# Patient Record
Sex: MALE | Race: WHITE | NOT HISPANIC OR LATINO | Employment: FULL TIME | URBAN - METROPOLITAN AREA
[De-identification: names, ages, dates, MRNs, and addresses within clinical notes are randomized per-mention and may not be internally consistent; named-entity substitution may affect disease eponyms.]

---

## 2022-09-08 ENCOUNTER — TELEPHONE (OUTPATIENT)
Dept: OTHER | Facility: OTHER | Age: 47
End: 2022-09-08

## 2022-10-03 ENCOUNTER — OFFICE VISIT (OUTPATIENT)
Dept: UROLOGY | Facility: AMBULATORY SURGERY CENTER | Age: 47
End: 2022-10-03
Payer: COMMERCIAL

## 2022-10-03 VITALS
DIASTOLIC BLOOD PRESSURE: 80 MMHG | OXYGEN SATURATION: 99 % | HEART RATE: 72 BPM | WEIGHT: 158 LBS | SYSTOLIC BLOOD PRESSURE: 130 MMHG

## 2022-10-03 DIAGNOSIS — N48.6 PEYRONIE'S DISEASE: Primary | ICD-10-CM

## 2022-10-03 PROCEDURE — 99203 OFFICE O/P NEW LOW 30 MIN: CPT | Performed by: NURSE PRACTITIONER

## 2022-10-03 NOTE — PROGRESS NOTES
10/3/2022    Assessment and Plan    52 y o  male managed by NEW PATIENT    1  Peyronie's Disease   · Referral to [facility] provided  · Given increasing pain and discomfort associated with sexual activity, we discussed refraining from sexual activity to follow-up performed with referral above        History of Present Illness  Josef Malone is a 52 y o  male here for follow up evaluation of  penile lump  Patient initially evaluated by [facility] secondary to complaints of a lump in his right lateral penis  Ultrasound of the penis was performed with no remarkable findings  Patient reports continued abnormal curvature of his erection to his right with increasing difficulty with sexual activity secondary to pain and difficulty penetrating  He denies all lower urinary tract symptoms  He denies prior injury or trauma to the penis  He denies difficulties with ejaculating  He denies scrotal/testicular symptoms  Review of Systems   Constitutional: Negative for chills and fever  Respiratory: Negative for cough and shortness of breath  Cardiovascular: Negative for chest pain  Gastrointestinal: Negative for abdominal distention, abdominal pain, blood in stool, nausea and vomiting  Genitourinary: Positive for penile pain  Negative for difficulty urinating, dysuria, enuresis, flank pain, frequency, hematuria and urgency  Skin: Negative for rash  Past Medical History  History reviewed  No pertinent past medical history      Past Social History  Past Surgical History:   Procedure Laterality Date    ROTATOR CUFF REPAIR Left 2017    SPINAL FUSION N/A 2005     Social History     Tobacco Use   Smoking Status Never Smoker   Smokeless Tobacco Never Used       Past Family History  Family History   Problem Relation Age of Onset    Ulcerative colitis Father     Breast cancer Paternal Grandmother     Heart attack Maternal Grandfather        Past Social history  Social History     Socioeconomic History    Marital status: /Civil Union     Spouse name: Not on file    Number of children: Not on file    Years of education: Not on file    Highest education level: Not on file   Occupational History    Not on file   Tobacco Use    Smoking status: Never Smoker    Smokeless tobacco: Never Used   Vaping Use    Vaping Use: Never used   Substance and Sexual Activity    Alcohol use: Yes     Comment: socially    Drug use: Never    Sexual activity: Yes     Partners: Female   Other Topics Concern    Not on file   Social History Narrative    Not on file     Social Determinants of Health     Financial Resource Strain: Not on file   Food Insecurity: Not on file   Transportation Needs: Not on file   Physical Activity: Not on file   Stress: Not on file   Social Connections: Not on file   Intimate Partner Violence: Not on file   Housing Stability: Not on file       Current Medications  No current outpatient medications on file  No current facility-administered medications for this visit  Allergies  No Known Allergies      The following portions of the patient's history were reviewed and updated as appropriate: allergies, current medications, past medical history, past social history, past surgical history and problem list       Vitals  Vitals:    10/03/22 1200   BP: 130/80   BP Location: Right arm   Patient Position: Sitting   Cuff Size: Adult   Pulse: 72   SpO2: 99%   Weight: 71 7 kg (158 lb)           Physical Exam  Physical Exam  Vitals reviewed  Constitutional:       General: He is not in acute distress  Appearance: Normal appearance  He is normal weight  HENT:      Head: Normocephalic  Cardiovascular:      Rate and Rhythm: Normal rate  Pulmonary:      Effort: No respiratory distress  Breath sounds: Normal breath sounds     Genitourinary:     Testes: Normal       Comments: Palpable right lateral scar tissue noted in the mid to proximal penile shaft approximately the size of a pea   Skin:     General: Skin is warm and dry  Neurological:      General: No focal deficit present  Mental Status: He is alert and oriented to person, place, and time  Psychiatric:         Mood and Affect: Mood normal          Behavior: Behavior normal        Results  No results found for this or any previous visit (from the past 1 hour(s))  ]  No results found for: PSA  No results found for: GLUCOSE, CALCIUM, NA, K, CO2, CL, BUN, CREATININE  No results found for: WBC, HGB, HCT, MCV, PLT    Orders  No orders of the defined types were placed in this encounter        AFSHAN Canas

## 2022-10-05 ENCOUNTER — TELEPHONE (OUTPATIENT)
Dept: UROLOGY | Facility: AMBULATORY SURGERY CENTER | Age: 47
End: 2022-10-05

## 2022-10-05 NOTE — TELEPHONE ENCOUNTER
Attempted to reach Patient at # [phone] listed in encounter  Phone just rang with no option to leave a message  Will need to try again at another time

## 2022-10-05 NOTE — TELEPHONE ENCOUNTER
Patient left voicemail  Patient last saw Lili Malagon on 10/3/22 in [place]  Patient requesting another doctor referral  He stated Dr Lorri Quiroz is not at either location that he was given      He is requesting a call back at [phone]

## 2022-10-06 NOTE — TELEPHONE ENCOUNTER
Patient returning call  Patient had the [phone] number but he was told he is not longer practicing there  He stated as per the website, looks like he is now in [facility] and phone number is [phone]

## 2023-03-29 ENCOUNTER — HOSPITAL ENCOUNTER (EMERGENCY)
Facility: HOSPITAL | Age: 48
Discharge: HOME | End: 2023-03-29
Attending: EMERGENCY MEDICINE

## 2023-03-29 VITALS
WEIGHT: 168.1 LBS | RESPIRATION RATE: 16 BRPM | DIASTOLIC BLOOD PRESSURE: 78 MMHG | TEMPERATURE: 97.6 F | OXYGEN SATURATION: 100 % | SYSTOLIC BLOOD PRESSURE: 121 MMHG | HEART RATE: 65 BPM

## 2023-03-29 DIAGNOSIS — S01.01XA LACERATION OF SCALP, INITIAL ENCOUNTER: Primary | ICD-10-CM

## 2023-03-29 NOTE — ED PROVIDER NOTES
History  Chief Complaint   Patient presents with   • Head Laceration     Walking under a piece of conduit and it caught his head and has a small laceration to the top of the head, bleeding controlled, no LOC  79-year-old male without significant past medical history presents for evaluation of laceration to the scalp after an injury at work  Patient states that he walked into a piece of conduit and noticed a cut on his head  Patient stated that it was a light hit and had no loss of consciousness but there was significant bleeding and he wanted to be evaluated  Patient was able to stop the bleeding prior to arrival   Patient denies any other complaints  History provided by:  Patient   used: No        None       History reviewed  No pertinent past medical history  Past Surgical History:   Procedure Laterality Date   • ROTATOR CUFF REPAIR Left 2017   • SPINAL FUSION N/A 2005       Family History   Problem Relation Age of Onset   • Ulcerative colitis Father    • Breast cancer Paternal Grandmother    • Heart attack Maternal Grandfather      I have reviewed and agree with the history as documented  E-Cigarette/Vaping   • E-Cigarette Use Never User      E-Cigarette/Vaping Substances     Social History     Tobacco Use   • Smoking status: Never   • Smokeless tobacco: Never   Vaping Use   • Vaping Use: Never used   Substance Use Topics   • Alcohol use: Yes     Comment: socially   • Drug use: Never       Review of Systems   Constitutional: Negative  Negative for chills and fatigue  HENT: Negative for ear pain and sore throat  Eyes: Negative for photophobia and redness  Respiratory: Negative for apnea, cough and shortness of breath  Cardiovascular: Negative for chest pain  Gastrointestinal: Negative for abdominal pain, nausea and vomiting  Genitourinary: Negative for dysuria  Musculoskeletal: Negative for arthralgias, neck pain and neck stiffness  Skin: Positive for wound  Negative for rash  Neurological: Negative for dizziness, tremors, syncope and weakness  Psychiatric/Behavioral: Negative for suicidal ideas  Physical Exam  Physical Exam  Constitutional:       General: He is not in acute distress  Appearance: He is well-developed  He is not diaphoretic  Eyes:      Pupils: Pupils are equal, round, and reactive to light  Cardiovascular:      Rate and Rhythm: Normal rate and regular rhythm  Pulmonary:      Effort: Pulmonary effort is normal  No respiratory distress  Breath sounds: Normal breath sounds  Abdominal:      General: Bowel sounds are normal  There is no distension  Palpations: Abdomen is soft  Musculoskeletal:         General: Normal range of motion  Cervical back: Normal range of motion and neck supple  Skin:     General: Skin is warm and dry  Comments: 4 cm linear laceration to the left frontal scalp  Minor bleeding  Neurological:      Mental Status: He is alert and oriented to person, place, and time  Vital Signs  ED Triage Vitals [03/29/23 0622]   Temperature Pulse Respirations Blood Pressure SpO2   97 6 °F (36 4 °C) 65 16 121/78 100 %      Temp Source Heart Rate Source Patient Position - Orthostatic VS BP Location FiO2 (%)   Oral Monitor Sitting Left arm --      Pain Score       No Pain           Vitals:    03/29/23 0622   BP: 121/78   Pulse: 65   Patient Position - Orthostatic VS: Sitting         Visual Acuity  Visual Acuity    Flowsheet Row Most Recent Value   L Pupil Size (mm) 3   R Pupil Size (mm) 3          ED Medications  Medications - No data to display    Diagnostic Studies  Results Reviewed     None                 No orders to display              Procedures  Laceration repair    Date/Time: 3/29/2023 6:40 AM  Performed by: Mer De La Cruz PA-C  Authorized by: Mer De La Cruz PA-C   Consent: Verbal consent obtained    Risks and benefits: risks, benefits and alternatives were discussed  Consent given "by: patient  Patient understanding: patient states understanding of the procedure being performed  Patient consent: the patient's understanding of the procedure matches consent given  Procedure consent: procedure consent matches procedure scheduled  Relevant documents: relevant documents present and verified  Test results: test results available and properly labeled  Site marked: the operative site was marked  Radiology Images displayed and confirmed  If images not available, report reviewed: imaging studies available  Required items: required blood products, implants, devices, and special equipment available  Patient identity confirmed: verbally with patient  Time out: Immediately prior to procedure a \"time out\" was called to verify the correct patient, procedure, equipment, support staff and site/side marked as required  Body area: head/neck  Laceration length: 4 cm  Tendon involvement: none  Nerve involvement: none  Vascular damage: no      Procedure Details:  Preparation: Patient was prepped and draped in the usual sterile fashion  Skin closure: staples  Number of sutures: 4 (staples)  Approximation: close  Approximation difficulty: simple  Patient tolerance: patient tolerated the procedure well with no immediate complications               ED Course                                             Medical Decision Making  Laceration closed by me in procedure documented  Well tolerated  No complications  Good approximation achieved and bleeding controlled  Pt was neurovascularly intact distally following procedure  Pt was educated on supportive care and return precautions and stable for d/c home           Disposition  Final diagnoses:   Laceration of scalp, initial encounter     Time reflects when diagnosis was documented in both MDM as applicable and the Disposition within this note     Time User Action Codes Description Comment    3/29/2023  6:28 AM Mauro Dose Add [S01 01XA] Laceration of scalp, initial " encounter       ED Disposition     ED Disposition   Discharge    Condition   Stable    Date/Time   Wed Mar 29, 2023  6:28 AM    Comment   Emma Baker discharge to home/self care  Follow-up Information     Follow up With Specialties Details Why Contact Info Additional [address] [facility] Emergency Department Emergency Medicine Go to  If symptoms worsen [address] [facility] [id number] [address] [facility] Emergency Department          Patient's Medications    No medications on file       No discharge procedures on file      PDMP Review     None          ED Provider  Electronically Signed by           Reji Luciano PA-C  03/29/23 7763

## 2023-03-29 NOTE — Clinical Note
Jamie Childers was seen and treated in our emergency department on 3/29/2023  No restrictions            Diagnosis:     Dayron Su  may return to work on return date  He may return on this date: 03/30/2023         If you have any questions or concerns, please don't hesitate to call        Mer De La Cruz PA-C    ______________________________           _______________          _______________  Hospital Representative                              Date                                Time

## 2023-03-29 NOTE — Clinical Note
Xander Dawson was seen and treated in our emergency department on 3/29/2023  No restrictions            Diagnosis:     David Brady  may return to work on return date  He may return on this date: 03/30/2023         If you have any questions or concerns, please don't hesitate to call        Elzbieta Santiago PA-C    ______________________________           _______________          _______________  Hospital Representative                              Date                                Time

## 2023-03-29 NOTE — DISCHARGE INSTRUCTIONS
Keep area clean and covered, minor bleeding may occur, apply pressure if this occurs  Apply ice to the area as needed for pain and swelling  Use Tylenol for pain  Follow-up with primary care   Return in 10-12 days for staple removal